# Patient Record
Sex: MALE | Race: OTHER | HISPANIC OR LATINO | Employment: FULL TIME | URBAN - METROPOLITAN AREA
[De-identification: names, ages, dates, MRNs, and addresses within clinical notes are randomized per-mention and may not be internally consistent; named-entity substitution may affect disease eponyms.]

---

## 2020-07-09 ENCOUNTER — TELEPHONE (OUTPATIENT)
Dept: FAMILY MEDICINE CLINIC | Facility: CLINIC | Age: 50
End: 2020-07-09

## 2020-07-09 NOTE — TELEPHONE ENCOUNTER
Patient called to schedule appt for [facility] for 7/10/20  Appt confirmed and covid screening was done  1  Do you presently have a fever or flu-like symptoms? No  2  Do you have symptoms of an upper respiratory infection like runny nose, sore throat, or cough? No  3  Are you suffering from new headache that you have not had in the past?  No  4  Do you have/have you experienced any new shortness of breath recently? No  5  Do you have any new diarrhea, nausea or vomiting? No  6  Have you been in contact with anyone who has been sick or diagnosed with COVID-19?  Mile Hernandez

## 2020-07-10 PROBLEM — Z00.00 ROUTINE MEDICAL EXAM: Status: ACTIVE | Noted: 2020-07-10
